# Patient Record
Sex: FEMALE | HISPANIC OR LATINO | Employment: OTHER | URBAN - METROPOLITAN AREA
[De-identification: names, ages, dates, MRNs, and addresses within clinical notes are randomized per-mention and may not be internally consistent; named-entity substitution may affect disease eponyms.]

---

## 2020-08-01 ENCOUNTER — OFFICE VISIT (OUTPATIENT)
Dept: URGENT CARE | Facility: CLINIC | Age: 35
End: 2020-08-01

## 2020-08-01 VITALS
BODY MASS INDEX: 45.54 KG/M2 | HEART RATE: 82 BPM | OXYGEN SATURATION: 99 % | WEIGHT: 257 LBS | DIASTOLIC BLOOD PRESSURE: 78 MMHG | HEIGHT: 63 IN | TEMPERATURE: 98.8 F | SYSTOLIC BLOOD PRESSURE: 124 MMHG | RESPIRATION RATE: 18 BRPM

## 2020-08-01 DIAGNOSIS — J01.00 ACUTE NON-RECURRENT MAXILLARY SINUSITIS: ICD-10-CM

## 2020-08-01 PROCEDURE — 99203 OFFICE O/P NEW LOW 30 MIN: CPT | Performed by: NURSE PRACTITIONER

## 2020-08-01 RX ORDER — AMOXICILLIN AND CLAVULANATE POTASSIUM 875; 125 MG/1; MG/1
1 TABLET, FILM COATED ORAL 2 TIMES DAILY
Qty: 14 TAB | Refills: 0 | Status: SHIPPED | OUTPATIENT
Start: 2020-08-01 | End: 2022-12-31

## 2020-08-01 RX ORDER — LORATADINE 10 MG/1
10 TABLET ORAL DAILY
COMMUNITY

## 2020-08-01 ASSESSMENT — ENCOUNTER SYMPTOMS
NECK PAIN: 0
HEADACHES: 1
SWOLLEN GLANDS: 0
FEVER: 0
SINUS PRESSURE: 1
CHILLS: 0
SORE THROAT: 0
SINUS PAIN: 1
HOARSE VOICE: 0
COUGH: 0
SHORTNESS OF BREATH: 0

## 2020-08-01 NOTE — PROGRESS NOTES
Subjective:      Lashae Fuchs is a 34 y.o. female who presents with Sinus Problem (x2 days, nose is clogged, pressure in the cheek bones and back of head )            Sinus Problem   This is a new problem. The current episode started in the past 7 days. The problem is unchanged. There has been no fever. The pain is moderate. Associated symptoms include congestion, headaches and sinus pressure. Pertinent negatives include no chills, coughing, hoarse voice, neck pain, shortness of breath, sneezing, sore throat or swollen glands. (Pain is unilateral) Past treatments include oral decongestants. The treatment provided mild relief.     Patient has no known allergies.  Current Outpatient Medications on File Prior to Visit   Medication Sig Dispense Refill   • loratadine (CLARITIN) 10 MG Tab Take 10 mg by mouth every day.     • Misc. Devices (BREAST PUMP) MISC 1 Device by Apply externally route as needed. 1 Each 0   • oxycodone-acetaminophen (PERCOCET) 5-325 MG TABS Take 1 Tab by mouth every four hours as needed for Moderate Pain ((Pain Scale 4-6)). 25 Tab 0   • ibuprofen (MOTRIN) 800 MG TABS Take 1 Tab by mouth every 8 hours as needed (Cramping). 30 Tab 1   • prenatal vit/fe fumarate/fa (PRENATAL S) 27-0.8 MG TABS Take 1 Tab by mouth every morning.       No current facility-administered medications on file prior to visit.      Social History     Socioeconomic History   • Marital status:      Spouse name: Not on file   • Number of children: Not on file   • Years of education: Not on file   • Highest education level: Not on file   Occupational History   • Not on file   Social Needs   • Financial resource strain: Not on file   • Food insecurity     Worry: Not on file     Inability: Not on file   • Transportation needs     Medical: Not on file     Non-medical: Not on file   Tobacco Use   • Smoking status: Current Some Day Smoker     Types: Cigarettes     Last attempt to quit: 2013     Years since quittin.9   •  "Smokeless tobacco: Never Used   Substance and Sexual Activity   • Alcohol use: Yes     Comment: occassionally   • Drug use: No   • Sexual activity: Yes     Partners: Male   Lifestyle   • Physical activity     Days per week: Not on file     Minutes per session: Not on file   • Stress: Not on file   Relationships   • Social connections     Talks on phone: Not on file     Gets together: Not on file     Attends Rastafarian service: Not on file     Active member of club or organization: Not on file     Attends meetings of clubs or organizations: Not on file     Relationship status: Not on file   • Intimate partner violence     Fear of current or ex partner: Not on file     Emotionally abused: Not on file     Physically abused: Not on file     Forced sexual activity: Not on file   Other Topics Concern   • Not on file   Social History Narrative   • Not on file     Breast Cancer-related family history is not on file. m    Review of Systems   Constitutional: Negative for chills and fever.   HENT: Positive for congestion, sinus pressure and sinus pain. Negative for hoarse voice, sneezing and sore throat.    Respiratory: Negative for cough and shortness of breath.    Musculoskeletal: Negative for neck pain.   Neurological: Positive for headaches.          Objective:     /78   Pulse 82   Temp 37.1 °C (98.8 °F) (Temporal)   Resp 18   Ht 1.6 m (5' 3\")   Wt 116.6 kg (257 lb)   SpO2 99%   BMI 45.53 kg/m²      Physical Exam  Constitutional:       General: She is not in acute distress.     Appearance: She is well-developed.   HENT:      Head: Normocephalic and atraumatic.      Right Ear: Ear canal and external ear normal. No middle ear effusion. Tympanic membrane is not injected or perforated.      Left Ear: Ear canal and external ear normal.  No middle ear effusion. Tympanic membrane is not injected or perforated.      Nose: Mucosal edema and congestion present.      Right Sinus: Maxillary sinus tenderness present.      " Mouth/Throat:      Pharynx: No oropharyngeal exudate or posterior oropharyngeal erythema.   Eyes:      General:         Right eye: No discharge.         Left eye: No discharge.      Conjunctiva/sclera: Conjunctivae normal.   Neck:      Musculoskeletal: Normal range of motion and neck supple.   Cardiovascular:      Rate and Rhythm: Normal rate and regular rhythm.      Heart sounds: Normal heart sounds. No murmur.   Pulmonary:      Effort: Pulmonary effort is normal. No respiratory distress.      Breath sounds: Normal breath sounds.   Musculoskeletal: Normal range of motion.      Comments: Normal movement of all 4 extremities.   Lymphadenopathy:      Cervical: No cervical adenopathy.      Upper Body:      Right upper body: No supraclavicular adenopathy.      Left upper body: No supraclavicular adenopathy.   Skin:     General: Skin is warm and dry.   Neurological:      Mental Status: She is alert and oriented to person, place, and time.      Gait: Gait normal.   Psychiatric:         Behavior: Behavior normal.         Thought Content: Thought content normal.                 Assessment/Plan:       1. Acute non-recurrent maxillary sinusitis  amoxicillin-clavulanate (AUGMENTIN) 875-125 MG Tab     Differential diagnosis, natural history, supportive care, and indications for immediate follow-up discussed at length.

## 2021-05-31 ENCOUNTER — OFFICE VISIT (OUTPATIENT)
Dept: URGENT CARE | Facility: PHYSICIAN GROUP | Age: 36
End: 2021-05-31
Payer: COMMERCIAL

## 2021-05-31 VITALS
HEART RATE: 71 BPM | OXYGEN SATURATION: 97 % | DIASTOLIC BLOOD PRESSURE: 74 MMHG | WEIGHT: 250 LBS | HEIGHT: 63 IN | SYSTOLIC BLOOD PRESSURE: 102 MMHG | TEMPERATURE: 97 F | BODY MASS INDEX: 44.3 KG/M2 | RESPIRATION RATE: 18 BRPM

## 2021-05-31 DIAGNOSIS — R59.0 LYMPHADENOPATHY OF HEAD AND NECK REGION: ICD-10-CM

## 2021-05-31 DIAGNOSIS — J01.90 ACUTE NON-RECURRENT SINUSITIS, UNSPECIFIED LOCATION: ICD-10-CM

## 2021-05-31 DIAGNOSIS — R22.0 RIGHT FACIAL SWELLING: ICD-10-CM

## 2021-05-31 PROCEDURE — 99213 OFFICE O/P EST LOW 20 MIN: CPT | Performed by: NURSE PRACTITIONER

## 2021-05-31 RX ORDER — AMOXICILLIN AND CLAVULANATE POTASSIUM 875; 125 MG/1; MG/1
1 TABLET, FILM COATED ORAL 2 TIMES DAILY
Qty: 20 TABLET | Refills: 0 | Status: SHIPPED | OUTPATIENT
Start: 2021-05-31 | End: 2021-06-10

## 2021-05-31 NOTE — PROGRESS NOTES
Subjective:     Lashae Fuchs is a 35 y.o. female who presents for Jaw Pain (R side cheek swelling, jaw pain, radiates to R ear and head, x3 days )       Other  This is a new problem. Episode onset: 3 days. The problem has been gradually worsening. Associated symptoms include headaches. Pertinent negatives include no weakness.     Patient reporting right-sided swelling, tenderness, and pain.  Pain radiates from the jaw to her ear and head.    Patient was screened prior to rooming and denied COVID-19 diagnosis or contact with a person who has been diagnosed or is suspected to have COVID-19. During this visit, appropriate PPE was worn, hand hygiene was performed, and the patient and any visitors were masked.     PMH:  has a past medical history of PCOS (polycystic ovarian syndrome).    MEDS:   Current Outpatient Medications:   •  METFORMIN HCL PO, Take  by mouth., Disp: , Rfl:   •  amoxicillin-clavulanate (AUGMENTIN) 875-125 MG Tab, Take 1 tablet by mouth 2 times a day for 10 days., Disp: 20 tablet, Rfl: 0  •  loratadine (CLARITIN) 10 MG Tab, Take 10 mg by mouth every day. (Patient not taking: Reported on 5/31/2021), Disp: , Rfl:   •  amoxicillin-clavulanate (AUGMENTIN) 875-125 MG Tab, Take 1 Tab by mouth 2 times a day. (Patient not taking: Reported on 5/31/2021), Disp: 14 Tab, Rfl: 0  •  Misc. Devices (BREAST PUMP) MISC, 1 Device by Apply externally route as needed. (Patient not taking: Reported on 5/31/2021), Disp: 1 Each, Rfl: 0  •  oxycodone-acetaminophen (PERCOCET) 5-325 MG TABS, Take 1 Tab by mouth every four hours as needed for Moderate Pain ((Pain Scale 4-6)). (Patient not taking: Reported on 5/31/2021), Disp: 25 Tab, Rfl: 0  •  ibuprofen (MOTRIN) 800 MG TABS, Take 1 Tab by mouth every 8 hours as needed (Cramping). (Patient not taking: Reported on 5/31/2021), Disp: 30 Tab, Rfl: 1  •  prenatal vit/fe fumarate/fa (PRENATAL S) 27-0.8 MG TABS, Take 1 Tab by mouth every morning. (Patient not taking: Reported on  "5/31/2021), Disp: , Rfl:     ALLERGIES: No Known Allergies    SURGHX:   Past Surgical History:   Procedure Laterality Date   • PRIMARY C SECTION  5/29/2014    Performed by Alexandra Mata M.D. at [facility]   • EYE SURGERY       SOCHX:  reports that she has been smoking cigarettes. She has never used smokeless tobacco. She reports current alcohol use. She reports that she does not use drugs.     FH: Reviewed with patient, not pertinent to this visit.    Review of Systems   Constitutional: Negative.  Negative for malaise/fatigue.   HENT: Positive for ear pain.         Right-sided facial swelling, tenderness, pain   Neurological: Positive for headaches. Negative for sensory change and weakness.   All other systems reviewed and are negative.    Additional details per HPI.      Objective:     /74 (BP Location: Right arm, Patient Position: Sitting, BP Cuff Size: Large adult)   Pulse 71   Temp 36.1 °C (97 °F) (Temporal)   Resp 18   Ht 1.6 m (5' 3\")   Wt 113 kg (250 lb)   SpO2 97%   BMI 44.29 kg/m²     Physical Exam  Vitals reviewed.   Constitutional:       General: She is not in acute distress.     Appearance: She is well-developed. She is not ill-appearing or toxic-appearing.   HENT:      Head: Normocephalic.      Comments: Right side of face visibly swollen compared to left     Right Ear: Tympanic membrane and external ear normal.      Left Ear: External ear normal.      Nose:      Right Sinus: Maxillary sinus tenderness and frontal sinus tenderness present.   Eyes:      General: Vision grossly intact.      Extraocular Movements: Extraocular movements intact.      Conjunctiva/sclera: Conjunctivae normal.   Cardiovascular:      Rate and Rhythm: Normal rate.   Pulmonary:      Effort: Pulmonary effort is normal. No respiratory distress.   Musculoskeletal:         General: No deformity. Normal range of motion.      Cervical back: Normal range of motion.   Lymphadenopathy:      Cervical: Cervical " adenopathy (Right) present.   Skin:     General: Skin is warm and dry.      Coloration: Skin is not pale.   Neurological:      Mental Status: She is alert and oriented to person, place, and time.      Sensory: No sensory deficit.      Motor: No weakness.      Coordination: Coordination normal.   Psychiatric:         Behavior: Behavior normal. Behavior is cooperative.       Assessment/Plan:     1. Right facial swelling    2. Acute non-recurrent sinusitis, unspecified location  - amoxicillin-clavulanate (AUGMENTIN) 875-125 MG Tab; Take 1 tablet by mouth 2 times a day for 10 days.  Dispense: 20 tablet; Refill: 0    3. Lymphadenopathy of head and neck region  - amoxicillin-clavulanate (AUGMENTIN) 875-125 MG Tab; Take 1 tablet by mouth 2 times a day for 10 days.  Dispense: 20 tablet; Refill: 0    Rx as above sent electronically. Warm compress PRN. Ibuprofen PRN.    Differential diagnosis, natural history, supportive care, over-the-counter symptom management per 's instructions, close monitoring, and indications for immediate follow-up discussed.     Patient advised to: Return for 1) Symptoms that worsen/don't improve, or go to ER, 2) Follow up with primary care in 7-10 days.    All questions answered. Patient agrees with the plan of care.

## 2021-06-04 ASSESSMENT — VISUAL ACUITY: OU: 1

## 2021-06-04 ASSESSMENT — ENCOUNTER SYMPTOMS
HEADACHES: 1
SENSORY CHANGE: 0
CONSTITUTIONAL NEGATIVE: 1
WEAKNESS: 0

## 2022-07-08 ENCOUNTER — GYNECOLOGY VISIT (OUTPATIENT)
Dept: OBGYN | Facility: CLINIC | Age: 37
End: 2022-07-08
Payer: COMMERCIAL

## 2022-07-08 VITALS — BODY MASS INDEX: 47.47 KG/M2 | WEIGHT: 268 LBS | SYSTOLIC BLOOD PRESSURE: 128 MMHG | DIASTOLIC BLOOD PRESSURE: 72 MMHG

## 2022-07-08 DIAGNOSIS — E28.2 PCOS (POLYCYSTIC OVARIAN SYNDROME): ICD-10-CM

## 2022-07-08 DIAGNOSIS — Z30.09 GENERAL COUNSELING AND ADVICE ON FEMALE CONTRACEPTION: ICD-10-CM

## 2022-07-08 DIAGNOSIS — E66.9 OBESITY (BMI 35.0-39.9 WITHOUT COMORBIDITY): ICD-10-CM

## 2022-07-08 DIAGNOSIS — Z87.891 QUIT SMOKING WITHIN PAST YEAR: ICD-10-CM

## 2022-07-08 LAB
INT CON NEG: NORMAL
INT CON POS: NORMAL
POC URINE PREGNANCY TEST: NEGATIVE

## 2022-07-08 PROCEDURE — 99204 OFFICE O/P NEW MOD 45 MIN: CPT | Performed by: OBSTETRICS & GYNECOLOGY

## 2022-07-08 PROCEDURE — 81025 URINE PREGNANCY TEST: CPT | Performed by: OBSTETRICS & GYNECOLOGY

## 2022-07-08 RX ORDER — NORETHINDRONE ACETATE AND ETHINYL ESTRADIOL AND FERROUS FUMARATE 1MG-20(24)
1 KIT ORAL DAILY
Qty: 90 TABLET | Refills: 3 | Status: SHIPPED | OUTPATIENT
Start: 2022-07-08

## 2022-07-08 NOTE — PROGRESS NOTES
"36 y.o.     Female presents as new patient for evaluation for PCOS. Dx in . Pt has very rare periods. She reports her last \"normal period\" was years ago. She does report last month has 5 days of brown bleeding, light to moderate bleeding. Denies cramping. Pt is taking metformin. Started this about 3 months ago. Has been taking it for 2 months now. Pt denies any weight loss.     Pt reports in the past when she was trying to get pregnant and went through fertility treatments with no luck. Then she lost weight througha  paleo diet and some injections of Vit B12 \"fat nburners\" and phentermine. She lost a 100#.  She was able to get pregnant on her own after that. She ahs regained about 70#. Would like to be pregnant again. Pt reports she was actively trying for awhile but has stopped as it didn't work.     Pt does not exercise. Does not eat any specific diet at this time. Tried to eat vegtables daily with dinner. She also eat smoothies.     Pt is a smoker. Reports she is an off/on for 15 years. She reports she recently stopped smoking 1 month ago. She reports it has been very hard but she does feel better now. Pt reports she was recently dx with pre-diabetes.    Pt would like to start an ocp, no contraindications.     LMP:  Patient's last menstrual period was 06/15/2022 (approximate).. Pt reports her periods are irregular, due to PCOS, heavy no, painful no . Pt is currently using none for birth control.   Vaccines:       ROS:  Neurological: Denies  Breast: Denies breast tenderness, mass, discharge, changes in size or contour, or abnormal cyclic discomfort.  Gastrointestinal: Negative for abdominal discomfort, blood in stools or black stools  Genito-urinary: Negative for dysuria, frequency and incontinence  All other systems reviewed : and are Negative    PMH:  Past Medical History:   Diagnosis Date   • PCOS (polycystic ovarian syndrome)        Past Surgical History:   Procedure Laterality Date   • PRIMARY C " SECTION  2014    Performed by Alexandra Mata M.D. at [facility]   • EYE SURGERY         Past GYN hx:     Family History   Problem Relation Age of Onset   • Diabetes Father    • Diabetes Sister        Social History     Socioeconomic History   • Marital status:      Spouse name: Not on file   • Number of children: Not on file   • Years of education: Not on file   • Highest education level: Not on file   Occupational History   • Not on file   Tobacco Use   • Smoking status: Current Some Day Smoker     Types: Cigarettes     Last attempt to quit: 2013     Years since quittin.9   • Smokeless tobacco: Never Used   Vaping Use   • Vaping Use: Never used   Substance and Sexual Activity   • Alcohol use: Yes     Comment: occassionally   • Drug use: No   • Sexual activity: Yes     Partners: Male   Other Topics Concern   • Not on file   Social History Narrative   • Not on file     Social Determinants of Health     Financial Resource Strain: Not on file   Food Insecurity: Not on file   Transportation Needs: Not on file   Physical Activity: Not on file   Stress: Not on file   Social Connections: Not on file   Intimate Partner Violence: Not on file   Housing Stability: Not on file       Current Outpatient Medications on File Prior to Visit   Medication Sig Dispense Refill   • METFORMIN HCL PO Take  by mouth.     • loratadine (CLARITIN) 10 MG Tab Take 10 mg by mouth every day. (Patient not taking: Reported on 2021)     • amoxicillin-clavulanate (AUGMENTIN) 875-125 MG Tab Take 1 Tab by mouth 2 times a day. (Patient not taking: Reported on 2021) 14 Tab 0   • Misc. Devices (BREAST PUMP) MISC 1 Device by Apply externally route as needed. (Patient not taking: Reported on 2021) 1 Each 0   • oxycodone-acetaminophen (PERCOCET) 5-325 MG TABS Take 1 Tab by mouth every four hours as needed for Moderate Pain ((Pain Scale 4-6)). (Patient not taking: Reported on 2021) 25 Tab 0   •  ibuprofen (MOTRIN) 800 MG TABS Take 1 Tab by mouth every 8 hours as needed (Cramping). (Patient not taking: Reported on 2021) 30 Tab 1   • prenatal vit/fe fumarate/fa (PRENATAL S) 27-0.8 MG TABS Take 1 Tab by mouth every morning. (Patient not taking: Reported on 2021)       No current facility-administered medications on file prior to visit.       Summary of Allergies:  Patient has no known allergies.  /72   Wt 122 kg (268 lb)   LMP 06/15/2022 (Approximate)   BMI 47.47 kg/m²     Exam:  Skin: Warm and dry with no lesions or rashes.  Neuro: Awake, alert and oriented x 3  Extremities:no cyanosis, clubbing or edema present    Psych: normal affect    Assessment and Plan:  36 y.o.  here for consultation about PCOS  Patient Active Problem List   Diagnosis   • Supervision of normal first pregnancy   • Obesity (BMI 35.0-39.9 without comorbidity)   • Pregnancy      -We discussed with PCOS the main things we worry about are fertility and uterine protection. We discussed PCOS leads to annovulation thus difficulties with fertility and unopposed estrogen acting at the uterus and increasing risk of endometrial CA. We discussed treatments for PCOS include lifestyle changes that lead to weight loss as well as hormonal treatment for uterine protection either via BC option or progesterone to induce withdrawal bleeding.  -We disused a WFPB lifestyle can lead to weight loss, help to regulate periods, increase chance for fertility, as well as reverse pre-diabetes. Handout given. Pt will consider.   -WE discussed option for uterine protection. Pt would like to start with a BC pills no, no contraindications ans pt has stopped smoking. We reviewed th risk of stroke and blood clots. Pt states understanding.   -Pt aware metformin will not necessarily cure PCOS, may help with weight loss as well as pre-diabetes. PT can stay on this per PCP recommendations. Not as strongly recommended any longer simply for PCOS.  -RTC  in 3 months for review of symptoms and lifestyle changes. Pt agrees.   -Pt congratulated on smoking cessation and encouraged to keep it up.     I spent approx 45 mint with pt reviewing history, discussed PCOS and treatment option, > 50% spent coordinating care.

## 2022-07-08 NOTE — PROGRESS NOTES
Patient here c/o PCOS  LMP=  06/15/2022  BCM: None   Last pap date/result: done in [facility] states normal   Phone number: [phone]   Pharmacy confirmed.

## 2022-12-31 ENCOUNTER — OFFICE VISIT (OUTPATIENT)
Dept: URGENT CARE | Facility: CLINIC | Age: 37
End: 2022-12-31
Payer: COMMERCIAL

## 2022-12-31 ENCOUNTER — APPOINTMENT (OUTPATIENT)
Dept: RADIOLOGY | Facility: IMAGING CENTER | Age: 37
End: 2022-12-31
Attending: NURSE PRACTITIONER
Payer: COMMERCIAL

## 2022-12-31 VITALS
SYSTOLIC BLOOD PRESSURE: 120 MMHG | DIASTOLIC BLOOD PRESSURE: 70 MMHG | BODY MASS INDEX: 46.95 KG/M2 | RESPIRATION RATE: 16 BRPM | OXYGEN SATURATION: 95 % | WEIGHT: 265 LBS | TEMPERATURE: 96.9 F | HEIGHT: 63 IN | HEART RATE: 69 BPM

## 2022-12-31 DIAGNOSIS — R06.2 WHEEZING: ICD-10-CM

## 2022-12-31 DIAGNOSIS — R05.1 ACUTE COUGH: ICD-10-CM

## 2022-12-31 DIAGNOSIS — J02.9 SORE THROAT: ICD-10-CM

## 2022-12-31 DIAGNOSIS — J10.1 INFLUENZA A: ICD-10-CM

## 2022-12-31 LAB
EXTERNAL QUALITY CONTROL: NORMAL
FLUAV+FLUBV AG SPEC QL IA: POSITIVE
INT CON NEG: NEGATIVE
INT CON POS: POSITIVE
S PYO AG THROAT QL: NEGATIVE
SARS-COV+SARS-COV-2 AG RESP QL IA.RAPID: NEGATIVE

## 2022-12-31 PROCEDURE — 87880 STREP A ASSAY W/OPTIC: CPT | Performed by: NURSE PRACTITIONER

## 2022-12-31 PROCEDURE — 87426 SARSCOV CORONAVIRUS AG IA: CPT | Performed by: NURSE PRACTITIONER

## 2022-12-31 PROCEDURE — 87804 INFLUENZA ASSAY W/OPTIC: CPT | Performed by: NURSE PRACTITIONER

## 2022-12-31 PROCEDURE — 99214 OFFICE O/P EST MOD 30 MIN: CPT | Performed by: NURSE PRACTITIONER

## 2022-12-31 PROCEDURE — 71046 X-RAY EXAM CHEST 2 VIEWS: CPT | Mod: TC | Performed by: PHYSICIAN ASSISTANT

## 2022-12-31 RX ORDER — ALBUTEROL SULFATE 90 UG/1
1-2 AEROSOL, METERED RESPIRATORY (INHALATION) EVERY 4 HOURS PRN
Qty: 8 G | Refills: 0 | Status: SHIPPED | OUTPATIENT
Start: 2022-12-31

## 2022-12-31 ASSESSMENT — ENCOUNTER SYMPTOMS
WHEEZING: 1
SINUS PAIN: 0
CARDIOVASCULAR NEGATIVE: 1
RHINORRHEA: 0
SHORTNESS OF BREATH: 0
SPUTUM PRODUCTION: 0
COUGH: 1
SORE THROAT: 1
FEVER: 1

## 2022-12-31 ASSESSMENT — VISUAL ACUITY: OU: 1

## 2022-12-31 NOTE — PROGRESS NOTES
Subjective:     Lashae Fuchs is a 37 y.o. female who presents for Sore Throat and Nasal Congestion (X 5 days )       URI   This is a new problem. Episode onset: 5 days ago. The problem has been gradually worsening. Associated symptoms include congestion, coughing, a sore throat and wheezing. Pertinent negatives include no chest pain, ear pain, rhinorrhea or sinus pain. Treatments tried: [facility] [place].     CC of hearing/feeling gurgling in the chest.    Review of Systems   Constitutional:  Positive for fever and malaise/fatigue.   HENT:  Positive for congestion and sore throat. Negative for ear pain, rhinorrhea and sinus pain.    Respiratory:  Positive for cough and wheezing. Negative for sputum production and shortness of breath.    Cardiovascular: Negative.  Negative for chest pain.   All other systems reviewed and are negative.    Refer to HPI for additional details.    During this visit, appropriate PPE was worn, hand hygiene was performed, and the patient and any visitors were masked.    PMH:  has a past medical history of PCOS (polycystic ovarian syndrome).    MEDS:   Current Outpatient Medications:     albuterol 108 (90 Base) MCG/ACT Aero Soln inhalation aerosol, Inhale 1-2 Puffs every four hours as needed for Shortness of Breath (and/or wheezing)., Disp: 8 g, Rfl: 0    Norethin Ace-Eth Estrad-FE 1-20 MG-MCG(24) Tab, Take 1 Tablet by mouth every day., Disp: 90 Tablet, Rfl: 3    METFORMIN HCL PO, Take  by mouth., Disp: , Rfl:     ibuprofen (MOTRIN) 800 MG TABS, Take 1 Tab by mouth every 8 hours as needed (Cramping)., Disp: 30 Tab, Rfl: 1    loratadine (CLARITIN) 10 MG Tab, Take 10 mg by mouth every day. (Patient not taking: Reported on 5/31/2021), Disp: , Rfl:     Misc. Devices (BREAST PUMP) MISC, 1 Device by Apply externally route as needed. (Patient not taking: Reported on 5/31/2021), Disp: 1 Each, Rfl: 0    oxycodone-acetaminophen (PERCOCET) 5-325 MG TABS, Take 1 Tab by mouth every four hours as needed for  "Moderate Pain ((Pain Scale 4-6)). (Patient not taking: Reported on 5/31/2021), Disp: 25 Tab, Rfl: 0    prenatal vit/fe fumarate/fa (PRENATAL S) 27-0.8 MG TABS, Take 1 Tab by mouth every morning. (Patient not taking: Reported on 5/31/2021), Disp: , Rfl:     ALLERGIES: No Known Allergies  SURGHX:   Past Surgical History:   Procedure Laterality Date    PRIMARY C SECTION  5/29/2014    Performed by Alexandra Mata M.D. at [facility]    EYE SURGERY       SOCHX:  reports that she has been smoking cigarettes. She has never used smokeless tobacco. She reports current alcohol use. She reports that she does not use drugs.    FH: Per HPI as applicable/pertinent.      Objective:     /70   Pulse 69   Temp 36.1 °C (96.9 °F)   Resp 16   Ht 1.6 m (5' 3\")   Wt 120 kg (265 lb)   SpO2 95%   BMI 46.94 kg/m²     Physical Exam  Nursing note reviewed.   Constitutional:       General: She is not in acute distress.     Appearance: She is well-developed. She is not ill-appearing or toxic-appearing.   HENT:      Head: Normocephalic.      Nose: Nose normal.      Mouth/Throat:      Mouth: Mucous membranes are moist.      Pharynx: Oropharynx is clear.   Eyes:      General: Vision grossly intact.      Extraocular Movements: Extraocular movements intact.      Conjunctiva/sclera: Conjunctivae normal.   Neck:      Trachea: Phonation normal.   Cardiovascular:      Rate and Rhythm: Normal rate and regular rhythm.      Heart sounds: Normal heart sounds.   Pulmonary:      Effort: Pulmonary effort is normal. No respiratory distress.      Breath sounds: Normal breath sounds. No decreased breath sounds.   Musculoskeletal:         General: No deformity. Normal range of motion.      Cervical back: Normal range of motion.   Skin:     General: Skin is warm and dry.      Coloration: Skin is not pale.   Neurological:      Mental Status: She is alert and oriented to person, place, and time.      Motor: No weakness.   Psychiatric:       "   Behavior: Behavior normal. Behavior is cooperative.     Chest x-ray:    Details    Reading Physician Reading Date Result Priority   Nico Dempsey M.D.  [phone] 12/31/2022 [facility]     Narrative & Impression     12/31/2022 3:14 PM     HISTORY/REASON FOR EXAM:  Cough.     TECHNIQUE/EXAM DESCRIPTION AND NUMBER OF VIEWS:  Two views of the chest.     COMPARISON:  None.     FINDINGS:     Cardiomediastinal silhouette is normal.     No focal consolidation, pleural effusion, pulmonary edema or pneumothorax.     No acute osseous abnormality.     IMPRESSION:     No acute cardiopulmonary abnormality.           Exam Ended: 12/31/22  3:24 PM Last Resulted: 12/31/22  3:26 PM           Radiology report and images reviewed by myself. Concur with findings.    POCT Covid: negative    Influenza A/B swab: positive A    Rapid Strep A swab: negative      Assessment/Plan:     1. Acute cough  - DX-CHEST-2 VIEWS; Future  - POCT SARS-COV Antigen [facility] Manual Result  - POCT Influenza A/B    2. Sore throat  - POCT Rapid Strep A    3. Influenza A    4. Wheezing  - albuterol 108 (90 Base) MCG/ACT Aero Soln inhalation aerosol; Inhale 1-2 Puffs every four hours as needed for Shortness of Breath (and/or wheezing).  Dispense: 8 g; Refill: 0    Discussed likely self-limiting viral etiology and expected course and duration of illness. Vital signs stable, afebrile, no acute distress at this time. Beyond window for Tamiflu.    Rx as above sent electronically for symptom relief. Continue OTC PRN.    Differential diagnosis, natural history, supportive care, rest, fluids, gargles, over-the-counter symptom management per 's instructions, ibuprofen, close monitoring, and indications for immediate follow-up discussed.     Warning signs reviewed. Return precautions discussed.     All questions answered. Patient agrees with the plan of care.    Discharge summary provided via Access Point.    Billing note: established patient with moderate  complexity and moderate risk. 61011. Please refer to LOS tool for details.

## 2025-06-26 ENCOUNTER — APPOINTMENT (OUTPATIENT)
Dept: RADIOLOGY | Facility: MEDICAL CENTER | Age: 40
End: 2025-06-26
Attending: EMERGENCY MEDICINE
Payer: COMMERCIAL

## 2025-06-26 ENCOUNTER — HOSPITAL ENCOUNTER (EMERGENCY)
Facility: MEDICAL CENTER | Age: 40
End: 2025-06-26
Attending: EMERGENCY MEDICINE
Payer: COMMERCIAL

## 2025-06-26 VITALS
DIASTOLIC BLOOD PRESSURE: 59 MMHG | OXYGEN SATURATION: 98 % | HEIGHT: 63 IN | BODY MASS INDEX: 49.61 KG/M2 | WEIGHT: 280 LBS | RESPIRATION RATE: 16 BRPM | HEART RATE: 75 BPM | TEMPERATURE: 97.2 F | SYSTOLIC BLOOD PRESSURE: 110 MMHG

## 2025-06-26 DIAGNOSIS — R30.0 DYSURIA: ICD-10-CM

## 2025-06-26 DIAGNOSIS — R31.21 ASYMPTOMATIC MICROSCOPIC HEMATURIA: Primary | ICD-10-CM

## 2025-06-26 LAB
ALBUMIN SERPL BCP-MCNC: 4.2 G/DL (ref 3.2–4.9)
ALBUMIN/GLOB SERPL: 1.4 G/DL
ALP SERPL-CCNC: 108 U/L (ref 30–99)
ALT SERPL-CCNC: 27 U/L (ref 2–50)
ANION GAP SERPL CALC-SCNC: 13 MMOL/L (ref 7–16)
APPEARANCE UR: CLEAR
AST SERPL-CCNC: 22 U/L (ref 12–45)
BACTERIA #/AREA URNS HPF: ABNORMAL /HPF
BASOPHILS # BLD AUTO: 0.7 % (ref 0–1.8)
BASOPHILS # BLD: 0.06 K/UL (ref 0–0.12)
BILIRUB SERPL-MCNC: <0.2 MG/DL (ref 0.1–1.5)
BILIRUB UR QL STRIP.AUTO: NEGATIVE
BUN SERPL-MCNC: 17 MG/DL (ref 8–22)
CALCIUM ALBUM COR SERPL-MCNC: 8.9 MG/DL (ref 8.5–10.5)
CALCIUM SERPL-MCNC: 9.1 MG/DL (ref 8.5–10.5)
CASTS URNS QL MICRO: ABNORMAL /LPF (ref 0–2)
CHLORIDE SERPL-SCNC: 105 MMOL/L (ref 96–112)
CO2 SERPL-SCNC: 22 MMOL/L (ref 20–33)
COLOR UR: YELLOW
CREAT SERPL-MCNC: 0.88 MG/DL (ref 0.5–1.4)
EOSINOPHIL # BLD AUTO: 0.65 K/UL (ref 0–0.51)
EOSINOPHIL NFR BLD: 7.3 % (ref 0–6.9)
EPITHELIAL CELLS 1715: ABNORMAL /HPF (ref 0–5)
ERYTHROCYTE [DISTWIDTH] IN BLOOD BY AUTOMATED COUNT: 41.3 FL (ref 35.9–50)
GFR SERPLBLD CREATININE-BSD FMLA CKD-EPI: 85 ML/MIN/1.73 M 2
GLOBULIN SER CALC-MCNC: 2.9 G/DL (ref 1.9–3.5)
GLUCOSE SERPL-MCNC: 130 MG/DL (ref 65–99)
GLUCOSE UR STRIP.AUTO-MCNC: NEGATIVE MG/DL
HCG SERPL QL: NEGATIVE
HCT VFR BLD AUTO: 42.3 % (ref 37–47)
HGB BLD-MCNC: 13.7 G/DL (ref 12–16)
IMM GRANULOCYTES # BLD AUTO: 0.02 K/UL (ref 0–0.11)
IMM GRANULOCYTES NFR BLD AUTO: 0.2 % (ref 0–0.9)
KETONES UR STRIP.AUTO-MCNC: NEGATIVE MG/DL
LEUKOCYTE ESTERASE UR QL STRIP.AUTO: NEGATIVE
LIPASE SERPL-CCNC: 23 U/L (ref 11–82)
LYMPHOCYTES # BLD AUTO: 3.22 K/UL (ref 1–4.8)
LYMPHOCYTES NFR BLD: 36.1 % (ref 22–41)
MCH RBC QN AUTO: 27.6 PG (ref 27–33)
MCHC RBC AUTO-ENTMCNC: 32.4 G/DL (ref 32.2–35.5)
MCV RBC AUTO: 85.1 FL (ref 81.4–97.8)
MICRO URNS: ABNORMAL
MONOCYTES # BLD AUTO: 0.49 K/UL (ref 0–0.85)
MONOCYTES NFR BLD AUTO: 5.5 % (ref 0–13.4)
NEUTROPHILS # BLD AUTO: 4.49 K/UL (ref 1.82–7.42)
NEUTROPHILS NFR BLD: 50.2 % (ref 44–72)
NITRITE UR QL STRIP.AUTO: NEGATIVE
NRBC # BLD AUTO: 0 K/UL
NRBC BLD-RTO: 0 /100 WBC (ref 0–0.2)
PH UR STRIP.AUTO: 5.5 [PH] (ref 5–8)
PLATELET # BLD AUTO: 267 K/UL (ref 164–446)
PMV BLD AUTO: 10.1 FL (ref 9–12.9)
POTASSIUM SERPL-SCNC: 4 MMOL/L (ref 3.6–5.5)
PROT SERPL-MCNC: 7.1 G/DL (ref 6–8.2)
PROT UR QL STRIP: NEGATIVE MG/DL
RBC # BLD AUTO: 4.97 M/UL (ref 4.2–5.4)
RBC # URNS HPF: ABNORMAL /HPF (ref 0–2)
RBC UR QL AUTO: ABNORMAL
SODIUM SERPL-SCNC: 140 MMOL/L (ref 135–145)
SP GR UR STRIP.AUTO: 1.02
UROBILINOGEN UR STRIP.AUTO-MCNC: 0.2 EU/DL
WBC # BLD AUTO: 8.9 K/UL (ref 4.8–10.8)
WBC #/AREA URNS HPF: ABNORMAL /HPF

## 2025-06-26 PROCEDURE — 83690 ASSAY OF LIPASE: CPT

## 2025-06-26 PROCEDURE — 74176 CT ABD & PELVIS W/O CONTRAST: CPT

## 2025-06-26 PROCEDURE — 84703 CHORIONIC GONADOTROPIN ASSAY: CPT

## 2025-06-26 PROCEDURE — 99284 EMERGENCY DEPT VISIT MOD MDM: CPT

## 2025-06-26 PROCEDURE — 85025 COMPLETE CBC W/AUTO DIFF WBC: CPT

## 2025-06-26 PROCEDURE — 36415 COLL VENOUS BLD VENIPUNCTURE: CPT

## 2025-06-26 PROCEDURE — 80053 COMPREHEN METABOLIC PANEL: CPT

## 2025-06-26 PROCEDURE — 81001 URINALYSIS AUTO W/SCOPE: CPT

## 2025-06-26 ASSESSMENT — PAIN DESCRIPTION - PAIN TYPE: TYPE: ACUTE PAIN

## 2025-06-27 NOTE — ED TRIAGE NOTES
"Chief Complaint   Patient presents with    Abdominal Pain     Pt presents with Lower abdominal pain that radiates upwards. Pt states she has had frequent UTI        Pt ambulatory to triage. Pt A&Ox4, for above complaint.     Pt to lobby . Pt educated on alerting staff in changes to condition. Pt verbalized understanding. Protocol Abdominal pain.     /81   Pulse 75   Temp 36.1 °C (96.9 °F) (Temporal)   Resp (!) 22   Ht 1.6 m (5' 3\")   Wt (!) 127 kg (280 lb)   LMP 01/27/2025 (Approximate)   SpO2 94%   BMI 49.60 kg/m²     "

## 2025-06-27 NOTE — ED PROVIDER NOTES
ED Provider Note    CHIEF COMPLAINT  Chief Complaint   Patient presents with    Abdominal Pain     Pt presents with Lower abdominal pain that radiates upwards. Pt states she has had frequent UTI        EXTERNAL RECORDS REVIEWED  Outpatient Notes patient has a history of PCOS    HPI/ROS  LIMITATION TO HISTORY   Select: : None  OUTSIDE HISTORIAN(S):  maria fernanda Fuchs is a 39 y.o. female who presents to the emergency department chief complaint of some lower abdominal pain.  She states that it did not really feel like her normal UTIs which are more burning in urination they are just this dull ache in her lower abdomen that earlier there is a pain that shot up from her left side all the way up her entire body.  She is the pain is a lot better now she does not have urinary frequency flank pain nausea vomiting dysuria hematuria vaginal discharge itching or bleeding.  Her last menstrual period was about 6 months ago due to PCOS    PAST MEDICAL HISTORY   has a past medical history of PCOS (polycystic ovarian syndrome).    SURGICAL HISTORY   has a past surgical history that includes eye surgery and primary c section (2014).    FAMILY HISTORY  Family History   Problem Relation Age of Onset    Diabetes Father     Diabetes Sister        SOCIAL HISTORY  Social History     Tobacco Use    Smoking status: Former     Current packs/day: 0.00     Types: Cigarettes     Quit date: 2013     Years since quittin.9    Smokeless tobacco: Never   Vaping Use    Vaping status: Never Used   Substance and Sexual Activity    Alcohol use: Yes     Comment: occassionally    Drug use: No    Sexual activity: Yes     Partners: Male       CURRENT MEDICATIONS  Home Medications       Reviewed by Ismael Alexandra RMaksimNMaksim (Registered Nurse) on 25 at 1954  Med List Status: Partial     Medication Last Dose Status   albuterol 108 (90 Base) MCG/ACT Aero Soln inhalation aerosol  Active   ibuprofen (MOTRIN) 800 MG TABS  Active   loratadine  "(CLARITIN) 10 MG Tab  Active   METFORMIN HCL PO  Active   Misc. Devices (BREAST PUMP) MISC  Active   Norethin Ace-Eth Estrad-FE 1-20 MG-MCG(24) Tab  Active   oxycodone-acetaminophen (PERCOCET) 5-325 MG TABS  Active   prenatal vit/fe fumarate/fa (PRENATAL S) 27-0.8 MG TABS  Active                    ALLERGIES  Allergies[1]    PHYSICAL EXAM  VITAL SIGNS: /81   Pulse 75   Temp 36.1 °C (96.9 °F) (Temporal)   Resp (!) 22   Ht 1.6 m (5' 3\")   Wt (!) 127 kg (280 lb)   LMP 01/27/2025 (Approximate)   SpO2 94%   BMI 49.60 kg/m²    Pulse OX: Pulse Oxygen level is within normal limits on room air  Constitutional: Alert in no apparent distress.  Obese woman  HENT: Normocephalic, Atraumatic, MMM  Eyes: PERound. Conjunctiva normal, non-icteric.   Heart: Regular rate and rhythm, intact distal pulses   Lungs: Symmetrical movement, no resp distress   Abdomen: Non-tender, non-distended, normal bowel sounds  EXT/Back no CVA tenderness  Skin: Warm, Dry, No erythema, No rash.   Neurologic: Alert and oriented, Grossly non-focal.       EKG/LABS  Labs Reviewed   CBC WITH DIFFERENTIAL - Abnormal; Notable for the following components:       Result Value    Eosinophils 7.30 (*)     Eos (Absolute) 0.65 (*)     All other components within normal limits   COMP METABOLIC PANEL - Abnormal; Notable for the following components:    Glucose 130 (*)     Alkaline Phosphatase 108 (*)     All other components within normal limits   URINALYSIS,CULTURE IF INDICATED - Abnormal; Notable for the following components:    Occult Blood Moderate (*)     All other components within normal limits   URINE MICROSCOPIC (W/UA) - Abnormal; Notable for the following components:    RBC 11-20 (*)     All other components within normal limits   LIPASE   HCG QUAL SERUM   ESTIMATED GFR       I have independently interpreted this EKG    RADIOLOGY/PROCEDURES   I have independently interpreted the diagnostic imaging associated with this visit and am waiting the " final reading from the radiologist.   My preliminary interpretation is as follows:     CT renal colic-no ureteral stone no hydronephrosis on either side no free fluid in the abdomen pelvis    Radiologist interpretation:  CT-RENAL COLIC EVALUATION(A/P W/O)   Final Result      No acute abnormality in the abdomen or pelvis. No renal stones or hydronephrosis.          COURSE & MEDICAL DECISION MAKING    ASSESSMENT, COURSE AND PLAN  Care Narrative:     Patient is a 39-year-old female who presents to the emergency department with just this atypical discomfort in her pelvis and maybe a little bit of urinary symptoms but not typical of her UTIs and then the sharp pain that shot up the left side that is now resolved.  Will evaluate for UTI but abdomen soft nontender I doubt a bacterial infection no other associated symptoms.  She denies any vaginal complaints at this time either      DISPOSITION AND DISCUSSIONS  8:58 PM  I reassessed the patient at bedside she has hematuria but no other signs of infection this may have been a kidney stone in the past or concurrent kidney stone will perform a CT renal colic to evaluate for stone but otherwise she still resting comfortably      9:31 PM  Radiology is currently down I was able to review the CT at the scanner where the procedure was performed  There is no evidence of hydronephrosis no ureteral stone bilaterally there is no evidence of free fluid in the pelvis no evidence of bowel inflammation otherwise was unremarkable.  Questionably 1 small stone in the bladder but nothing abnormal otherwise     Patient will be referred to urology show return to the emergency department for any new or worsening issues we discussed plenty of water ibuprofen Tylenol as needed she feels comfortable to plan and comfortable going home        I have discussed management of the patient with the following physicians and GABRIEL's:  none    Discussion of management with other QHP or appropriate source(s):  None     Escalation of care considered, and ultimately not performed:acute inpatient care management, however at this time, the patient is most appropriate for outpatient management    Barriers to care at this time, including but not limited to: na.     Decision tools and prescription drugs considered including, but not limited to: antibx are not indicated.    The patient will return for new or worsening symptoms and is stable at the time of discharge.    The patient is referred to a primary physician for blood pressure management, diabetic screening, and for all other preventative health concerns.    DISPOSITION:  Patient will be discharged home in stable condition.    FOLLOW UP:  [facility], Emergency Dept  [address] [facility] [id number] [phone]    If symptoms worsen    [facility] [address]  [facility] [id number] [phone]  Schedule an appointment as soon as possible for a visit         OUTPATIENT MEDICATIONS:  Discharge Medication List as of 6/26/2025  9:39 PM            FINAL DIAGNOSIS  1. Asymptomatic microscopic hematuria    2. Dysuria         Electronically signed by: Nicolasa Kearns M.D., 6/26/2025 8:28 PM           [1] No Known Allergies

## 2025-06-27 NOTE — ED NOTES
"Pt discharged home. Pt in possession of belongings. Pt provided discharge education and information pertaining to medications and follow up appointments. Pt received copy of discharge instructions and verbalized understanding. /59   Pulse 75   Temp 36.2 °C (97.2 °F) (Temporal)   Resp 16   Ht 1.6 m (5' 3\")   Wt (!) 127 kg (280 lb)   LMP 01/27/2025 (Approximate)   SpO2 98%   BMI 49.60 kg/m²     "

## 2025-08-30 ENCOUNTER — HOSPITAL ENCOUNTER (OUTPATIENT)
Facility: MEDICAL CENTER | Age: 40
End: 2025-08-30
Payer: COMMERCIAL

## 2025-08-30 ENCOUNTER — OFFICE VISIT (OUTPATIENT)
Dept: URGENT CARE | Facility: CLINIC | Age: 40
End: 2025-08-30
Payer: COMMERCIAL

## 2025-08-30 VITALS
OXYGEN SATURATION: 98 % | DIASTOLIC BLOOD PRESSURE: 72 MMHG | BODY MASS INDEX: 49.6 KG/M2 | SYSTOLIC BLOOD PRESSURE: 116 MMHG | TEMPERATURE: 97.1 F | HEART RATE: 82 BPM | RESPIRATION RATE: 16 BRPM | WEIGHT: 280 LBS

## 2025-08-30 DIAGNOSIS — N30.01 ACUTE CYSTITIS WITH HEMATURIA: ICD-10-CM

## 2025-08-30 DIAGNOSIS — N30.01 ACUTE CYSTITIS WITH HEMATURIA: Primary | ICD-10-CM

## 2025-08-30 LAB
APPEARANCE UR: NORMAL
BILIRUB UR STRIP-MCNC: NEGATIVE MG/DL
COLOR UR AUTO: NORMAL
GLUCOSE UR STRIP.AUTO-MCNC: NEGATIVE MG/DL
KETONES UR STRIP.AUTO-MCNC: NEGATIVE MG/DL
LEUKOCYTE ESTERASE UR QL STRIP.AUTO: NORMAL
NITRITE UR QL STRIP.AUTO: POSITIVE
PH UR STRIP.AUTO: 5 [PH] (ref 5–8)
POCT INT CON NEG: NEGATIVE
POCT INT CON POS: POSITIVE
POCT URINE PREGNANCY TEST: NEGATIVE
PROT UR QL STRIP: 30 MG/DL
RBC UR QL AUTO: NORMAL
SP GR UR STRIP.AUTO: 1.02
UROBILINOGEN UR STRIP-MCNC: 0.2 MG/DL

## 2025-08-30 PROCEDURE — 87086 URINE CULTURE/COLONY COUNT: CPT

## 2025-08-30 RX ORDER — NITROFURANTOIN 25; 75 MG/1; MG/1
100 CAPSULE ORAL 2 TIMES DAILY
Qty: 10 CAPSULE | Refills: 0 | Status: SHIPPED | OUTPATIENT
Start: 2025-08-30 | End: 2025-09-04

## 2025-08-30 ASSESSMENT — ENCOUNTER SYMPTOMS
SHORTNESS OF BREATH: 0
VOMITING: 0
HEADACHES: 0
NAUSEA: 0
COUGH: 0
MYALGIAS: 0
ABDOMINAL PAIN: 0
FEVER: 0
CHILLS: 0
DIARRHEA: 0
SORE THROAT: 0

## 2025-08-30 ASSESSMENT — FIBROSIS 4 INDEX: FIB4 SCORE: 0.62
